# Patient Record
Sex: FEMALE | Race: WHITE | URBAN - METROPOLITAN AREA
[De-identification: names, ages, dates, MRNs, and addresses within clinical notes are randomized per-mention and may not be internally consistent; named-entity substitution may affect disease eponyms.]

---

## 2019-10-12 ENCOUNTER — HISTORICAL (OUTPATIENT)
Dept: ADMINISTRATIVE | Facility: HOSPITAL | Age: 29
End: 2019-10-12

## 2022-05-01 NOTE — ED PROVIDER NOTES
Patient:   Sandy Pack             MRN: [id number]            FIN: [id number]               Age:   29 years     Sex:  Female     :  1990   Associated Diagnoses:   Complete miscarriage   Author:   Elisha Taylor      Basic Information   Time seen: Date & time 10/12/2019 11:15:00.   History source: Patient.   Arrival mode: Private vehicle.   History limitation: None.   Additional information: Chief Complaint from Nursing Triage Note : Chief Complaint   10/12/2019 11:12 CDT     Chief Complaint            3 POSITIVE PREG TESTS STARTED BLEEDING TODAY WITH MINIMAL CRAMPING.  HEADACHE.AND BACK ACHE  .      History of Present Illness   The patient presents with pt presents to ED wtih c/o abd cramping, vaginal bleeding, and severe HA that started at 3am today. She reports she believes she is pregnant (approx 4-5 wks) and 3 home test positive. She reports a hx of HTN but has not been taking any meds for some time. Stated she had part of a red bull this am. Reports having low back pain as well. .  The onset was 8  hours ago.  The course/duration of symptoms is fluctuating in intensity.  Character of pain: cramping, location: pelvic and back degree: moderate.  Bleeding: mild passing clots.  Pregnancy Status : 4, Para: 3 5 weeks.  The exacerbating factor is none.  The relieving factor is none.  Risk factors consist of hypertension and blood type O+.  Prior episodes: none.  Therapy today: none.  Associated symptoms: back pain.        Review of Systems   Constitutional symptoms:  Negative except as documented in HPI.   Skin symptoms:  Negative except as documented in HPI.   Eye symptoms:  Negative except as documented in HPI.   ENMT symptoms:  Negative except as documented in HPI.   Respiratory symptoms:  Negative except as documented in HPI.   Cardiovascular symptoms:  Negative except as documented in HPI.   Gastrointestinal symptoms:  Pelvic, acute, cramping.    Genitourinary symptoms:   Vaginal bleeding.   Musculoskeletal symptoms:  Back pain.   Neurologic symptoms:  Negative except as documented in HPI.      Health Status   Allergies:    Allergic Reactions (Selected)  Medium  Excedrin Migraine- Rash, swelling.  Lisinopril- Dry cough.  Losartan- Kidney function.  Zithromax- Rash.  High  Ceclor- Anaphalaxix.,    Allergies (5) Active Reaction  Ceclor ANAPHALAXIX  Excedrin Migraine RASH, SWELLING  lisinopril DRY COUGH  losartan KIDNEY FUNCTION  Zithromax RASH  .   Medications:  (Selected)   Inpatient Medications  Ordered  Tylenol: 650 mg, form: Tab, Oral, Once, first dose 10/12/19 11:19:00 CDT, stop date 10/12/19 11:19:00 CDT, STAT.      Past Medical/ Family/ Social History   Medical history:    No active or resolved past medical history items have been selected or recorded., Reviewed as documented in chart.   Surgical history:    No active procedure history items have been selected or recorded., Reviewed as documented in chart.   Family history:    No family history items have been selected or recorded., Reviewed as documented in chart.   Social history:    Social & Psychosocial Habits    Alcohol  10/12/2019  Use: Never    Substance Use  10/12/2019  Use: Never    Tobacco  10/12/2019  Use: 5-9 cigarettes (between 1    Patient Wants Consult For Cessation Counseling No    Abuse/Neglect  10/12/2019  SHX Any signs of abuse or neglect No    Feels unsafe at home: No    Safe place to go: Yes  , Reviewed as documented in chart.   Problem list:    No qualifying data available  , per nurse's notes.      Physical Examination               Vital Signs   Vital Signs   10/12/2019 11:12 CDT     Temperature Oral          36.6 DegC                             Temperature Oral (calculated)             97.88 DegF                             Peripheral Pulse Rate     92 bpm                             Respiratory Rate          18 br/min                             SpO2                      96 %                              Oxygen Therapy            Room air                             Systolic Blood Pressure   134 mmHg                             Diastolic Blood Pressure  95 mmHg  HI  .      Vital Signs (last 24 hrs)_____  Last Charted___________  Temp Oral     36.6 DegC  (OCT 12 11:12)  Heart Rate Peripheral   92 bpm  (OCT 12 11:12)  Resp Rate         18 br/min  (OCT 12 11:12)  SBP      134 mmHg  (OCT 12 11:12)  DBP      H 95mmHg  (OCT 12 11:12)  SpO2      96 %  (OCT 12 11:12)  .   Measurements   10/12/2019 11:12 CDT     Weight Dosing             95 kg                             Weight Measured and Calculated in Lbs     209.44 lb                             Weight Estimated          95 kg                             Height/Length Dosing      163 cm                             Height/Length Estimated   163 cm                             Body Mass Index Estimated 35.76 kg/m2  .   Basic Oxygen Information   10/12/2019 11:12 CDT     SpO2                      96 %                             Oxygen Therapy            Room air  .   General:  Alert, no acute distress.    Skin:  Warm, dry, intact.    Head:  Normocephalic.   Neck:  Supple.   Eye:  Pupils are equal, round and reactive to light, extraocular movements are intact.    Cardiovascular:  Regular rate and rhythm, No murmur, Normal peripheral perfusion, No edema.    Respiratory:  Lungs are clear to auscultation, respirations are non-labored, breath sounds are equal, Symmetrical chest wall expansion.    Gastrointestinal:  Soft, Nontender, Non distended, Normal bowel sounds.    Genitourinary:  deferred.   Back:  Nontender, Normal range of motion.    Musculoskeletal:  Normal ROM, normal strength.    Neurological:  Alert and oriented to person, place, time, and situation, No focal neurological deficit observed, normal sensory observed, normal motor observed, normal speech observed, normal coordination observed.    Lymphatics:  No lymphadenopathy.   Psychiatric:  Cooperative, appropriate  mood & affect.       Medical Decision Making   Results review:  Lab results : Lab View   10/12/2019 11:37 CDT     UA Appear                 Clear                             UA Color                  Yellow                             UA Spec Grav              1.015                             UA Bili                   Negative                             UA pH                     7.0                             UA Urobilinogen           0.2                             UA Blood                  Large                             UA Glucose                Negative                             UA Ketones                Negative                             UA Protein                Negative                             UA Nitrite                Negative                             UA Leuk Est               Negative                             UA WBC                    None Seen                             UA RBC                    10-20 /HPF                             UA Bacteria               None Seen                             UA Squam Epithelial       None Seen    10/12/2019 11:26 CDT     Sodium Lvl                139 mmol/L                             Potassium Lvl             3.8 mmol/L                             Chloride                  103 mmol/L                             CO2                       24.7 mmol/L                             Calcium Lvl               9.3 mg/dL                             Glucose Lvl               116 mg/dL  HI                             BUN                       7.2 mg/dL                             Creatinine                0.68 mg/dL                             eGFR-AA                   >60 mL/min/1.73 m2  NA                             eGFR-PERLA                  >60 mL/min/1.73 m2  NA                             Bili Total                0.4 mg/dL                             Bili Direct               0.09 mg/dL                             Bili Indirect             0.31 mg/dL                              AST                       21 unit/L                             ALT                       26 unit/L                             Alk Phos                  106 unit/L                             Total Protein             8.0 gm/dL                             Albumin Lvl               4.00 gm/dL                             Globulin                  4.00 gm/dL  HI                             A/G Ratio                 1.0 ratio  LOW                             Beta hCG Ql               Negative                             TSH                       0.862 mIU/mL                             WBC                       10.2 x10(3)/mcL                             RBC                       5.01 x10(6)/mcL                             Hgb                       14.1 gm/dL                             Hct                       44.0 %                             Platelet                  323 x10(3)/mcL                             MCV                       87.8 fL                             MCH                       28.1 pg                             MCHC                      32.0 gm/dL  LOW                             RDW                       14.1 %                             MPV                       10.2 fL                             Abs Neut                  7.20 x10(3)/mcL                             Neutro Auto               71 %  NA                             Lymph Auto                21 %  NA                             Mono Auto                 7 %  NA                             Eos Auto                  0 %  NA                             Abs Eos                   0.0 x10(3)/mcL                             Basophil Auto             0 %  NA                             Abs Neutro                7.20 x10(3)/mcL                             Abs Lymph                 2.2 x10(3)/mcL                             Abs Mono                  0.7 x10(3)/mcL                             Abs Baso                   0.0 x10(3)/mcL                             IG%                       0.200 %                             IG#                       0.0200 %  HI  ,    No qualifying data available.    Radiology results:  Reported at  10/12/2019 12:39:00, Ultrasound, OB transvaginal: reported by US tech no evidence of IUP or ectopic pregnancy. Normal pelvic US. .       Reexamination/ Reevaluation   Time: 10/12/2019 12:40:00 .   Vital signs   Basic Oxygen Information   10/12/2019 11:12 CDT     SpO2                      96 %                             Oxygen Therapy            Room air     Notes: Discussed POC and findings with pt. She verblaized understanding. .      Impression and Plan   Diagnosis   Complete miscarriage ([facility]22-[facility] O03.9)   Plan   Condition: Stable.    Disposition: Discharged: Time  10/12/2019 12:41:00, to home.    Prescriptions: [facility] prescriptions   Pharmacy:  Naprosyn 500 mg oral tablet (Prescribe): 500 mg = 1 tab(s), Oral, BID, X 7 day(s), # 14 tab(s), 0 Refill(s), Pharmacy: [id number].    Patient was given the following educational materials: Miscarriage, Easy-to-Read, Miscarriage, Easy-to-Read.    Follow up with: ; Follow up with OB/GYN Follow-up with PCP in 3 to 5 days  Call for followup appointment.    Counseled: Patient.    Sexually transmitted disease prophylaxis: Discussed.    Orders: [facility] Orders   Admit/Transfer/Discharge:  Discharge (Order): 10/12/2019 12:42 CDT, Home.

## 2024-08-14 ENCOUNTER — HOSPITAL ENCOUNTER (EMERGENCY)
Facility: HOSPITAL | Age: 34
Discharge: HOME | End: 2024-08-15
Attending: EMERGENCY MEDICINE

## 2024-08-14 DIAGNOSIS — J02.9 PHARYNGITIS, UNSPECIFIED ETIOLOGY: Primary | ICD-10-CM

## 2024-08-14 LAB
FLUAV AG UPPER RESP QL IA.RAPID: NOT DETECTED
FLUBV AG UPPER RESP QL IA.RAPID: NOT DETECTED
SARS-COV-2 RNA RESP QL NAA+PROBE: NOT DETECTED
STREP A PCR (OHS): NOT DETECTED

## 2024-08-14 PROCEDURE — 87651 STREP A DNA AMP PROBE: CPT | Performed by: EMERGENCY MEDICINE

## 2024-08-14 PROCEDURE — 0240U COVID/FLU A&B PCR: CPT | Performed by: EMERGENCY MEDICINE

## 2024-08-14 PROCEDURE — 99284 EMERGENCY DEPT VISIT MOD MDM: CPT

## 2024-08-15 VITALS
OXYGEN SATURATION: 96 % | HEART RATE: 66 BPM | TEMPERATURE: 98 F | WEIGHT: 220 LBS | SYSTOLIC BLOOD PRESSURE: 155 MMHG | DIASTOLIC BLOOD PRESSURE: 92 MMHG | HEIGHT: 64 IN | RESPIRATION RATE: 16 BRPM | BODY MASS INDEX: 37.56 KG/M2

## 2024-08-15 PROCEDURE — 25000003 PHARM REV CODE 250: Performed by: EMERGENCY MEDICINE

## 2024-08-15 PROCEDURE — 63600175 PHARM REV CODE 636 W HCPCS: Performed by: EMERGENCY MEDICINE

## 2024-08-15 PROCEDURE — 96372 THER/PROPH/DIAG INJ SC/IM: CPT | Performed by: EMERGENCY MEDICINE

## 2024-08-15 RX ORDER — BETAMETHASONE SODIUM PHOSPHATE AND BETAMETHASONE ACETATE 3; 3 MG/ML; MG/ML
12 INJECTION, SUSPENSION INTRA-ARTICULAR; INTRALESIONAL; INTRAMUSCULAR; SOFT TISSUE
Status: COMPLETED | OUTPATIENT
Start: 2024-08-15 | End: 2024-08-15

## 2024-08-15 RX ORDER — LIDOCAINE HYDROCHLORIDE 20 MG/ML
15 SOLUTION OROPHARYNGEAL ONCE
Status: COMPLETED | OUTPATIENT
Start: 2024-08-15 | End: 2024-08-15

## 2024-08-15 RX ORDER — ALUMINUM HYDROXIDE, MAGNESIUM HYDROXIDE, AND SIMETHICONE 1200; 120; 1200 MG/30ML; MG/30ML; MG/30ML
30 SUSPENSION ORAL ONCE
Status: COMPLETED | OUTPATIENT
Start: 2024-08-15 | End: 2024-08-15

## 2024-08-15 RX ORDER — DOXYCYCLINE 100 MG/1
100 CAPSULE ORAL 2 TIMES DAILY
Qty: 20 CAPSULE | Refills: 0 | Status: SHIPPED | OUTPATIENT
Start: 2024-08-15 | End: 2024-08-25

## 2024-08-15 RX ORDER — KETOROLAC TROMETHAMINE 10 MG/1
10 TABLET, FILM COATED ORAL EVERY 6 HOURS PRN
Qty: 20 TABLET | Refills: 0 | Status: SHIPPED | OUTPATIENT
Start: 2024-08-15

## 2024-08-15 RX ADMIN — LIDOCAINE HYDROCHLORIDE 15 ML: 20 SOLUTION ORAL at 12:08

## 2024-08-15 RX ADMIN — BETAMETHASONE SODIUM PHOSPHATE AND BETAMETHASONE ACETATE 12 MG: 3; 3 INJECTION, SUSPENSION INTRA-ARTICULAR; INTRALESIONAL; INTRAMUSCULAR at 12:08

## 2024-08-15 RX ADMIN — ALUMINUM HYDROXIDE, MAGNESIUM HYDROXIDE, AND SIMETHICONE 30 ML: 200; 200; 20 SUSPENSION ORAL at 12:08

## 2024-08-15 NOTE — ED PROVIDER NOTES
Encounter Date: 8/14/2024       History     Chief Complaint   Patient presents with    Sore Throat     Sore throat for 9 days, tried otc medications and home remedies.She noticed some lymph node swelling.  Tested negative on at home covid test     Patient is a 33-year-old female who presents with a chief complaint of 9 days of constant sore throat pain.  She states that she has some mild lymphadenopathy that is developing.  There was no airway compromise.  She does not have vomiting.  She does not have objective fever but she did feel warm.  There is no history of similar problems in the past.      Review of patient's allergies indicates:   Allergen Reactions    Aspirin-acetaminophen-caffeine Hives     Other Reaction(s): RASH, SWELLING    Azithromycin      Other Reaction(s): Angioedema, RASH    Cefaclor Anaphylaxis     Other Reaction(s): ANAPHALAXIX    Losartan-hydrochlorothiazide Other (See Comments)     Muscle weakness    Pineapple Anaphylaxis    Ceftriaxone Hives    Lisinopril Other (See Comments)     Other Reaction(s): DRY COUGH    cough    Losartan      Other Reaction(s): KIDNEY FUNCTION    Pcn [penicillins] Hives     No past medical history on file.  No past surgical history on file.  No family history on file.     Review of Systems   Constitutional:  Negative for chills and fever.   HENT:  Positive for sore throat. Negative for congestion, rhinorrhea, sneezing and trouble swallowing.    Eyes: Negative.    Respiratory:  Negative for cough and shortness of breath.    Cardiovascular:  Negative for chest pain, palpitations and leg swelling.   Gastrointestinal:  Negative for abdominal distention, abdominal pain, diarrhea, nausea and vomiting.   Genitourinary:  Negative for difficulty urinating, dysuria, flank pain, frequency, hematuria and urgency.   Musculoskeletal:  Negative for back pain, joint swelling, myalgias and neck pain.   Skin:  Negative for rash.   Neurological:  Negative for seizures, syncope,  weakness, light-headedness and headaches.   Hematological: Negative.    Psychiatric/Behavioral:  Negative for hallucinations, sleep disturbance and suicidal ideas.    All other systems reviewed and are negative.      Physical Exam     Initial Vitals [08/14/24 1954]   BP Pulse Resp Temp SpO2   (!) 162/107 95 18 98.5 °F (36.9 °C) 97 %      MAP       --         Physical Exam    Nursing note and vitals reviewed.  Constitutional: Vital signs are normal. She appears well-nourished. She is cooperative.  Non-toxic appearance. She does not appear ill. No distress.   HENT:   Head: Normocephalic and atraumatic.   Mouth/Throat: Uvula is midline and mucous membranes are normal. No oral lesions. No trismus in the jaw. Posterior oropharyngeal erythema present. No oropharyngeal exudate, posterior oropharyngeal edema or tonsillar abscesses.   Eyes: Conjunctivae, EOM and lids are normal. Pupils are equal, round, and reactive to light.   Neck: Trachea normal. Neck supple. No stridor present. No tracheal deviation present. No JVD present.   Normal range of motion.  Cardiovascular:  Normal rate, regular rhythm, normal heart sounds and normal pulses.           No murmur heard.  Abdominal: Abdomen is soft. Bowel sounds are normal. There is no abdominal tenderness. There is no rebound and no guarding.   Musculoskeletal:      Cervical back: Normal range of motion and neck supple. No rigidity.     Lymphadenopathy:     She has no cervical adenopathy.   Neurological: She is alert and oriented to person, place, and time. She has normal strength. No cranial nerve deficit or sensory deficit. GCS eye subscore is 4. GCS verbal subscore is 5. GCS motor subscore is 6.   Skin: Skin is warm, dry and intact. Capillary refill takes less than 2 seconds.   Psychiatric: She has a normal mood and affect. Her behavior is normal. Judgment normal. She is not actively hallucinating. Thought content is not delusional. She expresses no homicidal and no suicidal  ideation.         ED Course   Procedures  Labs Reviewed   STREP GROUP A BY PCR - Normal       Result Value    STREP A PCR ([facility]) Not Detected      Narrative:     The Xpert Xpress Strep A test is a rapid, qualitative in vitro diagnostic test for the detection of Streptococcus pyogenes (Group A ß-hemolytic Streptococcus, Strep A) in throat swab specimens from patients with signs and symptoms of pharyngitis.     COVID/FLU A&B PCR - Normal    Influenza A PCR Not Detected      Influenza B PCR Not Detected      SARS-CoV-2 PCR Not Detected      Narrative:     The Xpert Xpress SARS-CoV-2/FLU/RSV plus is a rapid, multiplexed real-time PCR test intended for the simultaneous qualitative detection and differentiation of SARS-CoV-2, Influenza A, Influenza B, and respiratory syncytial virus (RSV) viral RNA in either nasopharyngeal swab or nasal swab specimens.                Imaging Results    None          Medications   aluminum-magnesium hydroxide-simethicone 200-200-20 mg/5 mL suspension 30 mL (30 mLs Oral Given 8/15/24 0042)     And   LIDOcaine viscous HCl 2% oral solution 15 mL (15 mLs Oral Given 8/15/24 0043)   betamethasone acetate-betamethasone sodium phosphate injection 12 mg (12 mg Intramuscular Given 8/15/24 0043)     Medical Decision Making  The patient is now resting comfortably, alert, and in no distress. The patient has a normal mental status and is neurologically intact. The patient appears well and is able to tolerate food or fluid by mouth, and there is no significant dehydration. There is no respiratory distress and no signs of systemic toxicity. The history, exam, diagnostic testing (if any), and current condition do not suggest peritonsillar abscess, retropharyngeal abscess, epiglottitis, Gadiel's angina, deep neck abscess, sepsis or other serious bacterial infection requiring further testing, treatment, consultation, or admission at this time. The vital signs have been stable. There is no significant trismus,  asymmetry of the posterior oropharynx, or abnormal appearance of the uvula.  The patient's condition is stable and appropriate for discharge. As the discharge instructions indicate, the patient will pursue further outpatient evaluation with the primary care physician or another designated or consulting physician.      Risk  OTC drugs.  Prescription drug management.               ED Course as of 08/15/24 0111   Thu Aug 15, 2024   0100 Influenza A, Molecular: Not Detected [DB]   0100 Influenza B, Molecular: Not Detected [DB]   0100 SARS-CoV2 (COVID-19) Qualitative PCR: Not Detected [DB]   0100 Strep Group A by PCR [DB]      ED Course User Index  [DB] Chandu Mark MD                           Clinical Impression:  Final diagnoses:  [J02.9] Pharyngitis, unspecified etiology (Primary)          ED Disposition Condition    Discharge Stable          ED Prescriptions       Medication Sig Dispense Start Date End Date Auth. Provider    doxycycline (VIBRAMYCIN) 100 MG Cap Take 1 capsule (100 mg total) by mouth 2 (two) times daily. for 10 days 20 capsule 8/15/2024 8/25/2024 Chandu Mark MD    ketorolac (TORADOL) 10 mg tablet Take 1 tablet (10 mg total) by mouth every 6 (six) hours as needed for Pain. 20 tablet 8/15/2024 -- Chandu Mark MD          Follow-up Information    None          Chandu Mark MD  08/15/24 0111

## 2025-03-05 ENCOUNTER — HOSPITAL ENCOUNTER (EMERGENCY)
Facility: HOSPITAL | Age: 35
Discharge: HOME | End: 2025-03-05
Attending: STUDENT IN AN ORGANIZED HEALTH CARE EDUCATION/TRAINING PROGRAM

## 2025-03-05 VITALS
BODY MASS INDEX: 37.56 KG/M2 | RESPIRATION RATE: 19 BRPM | WEIGHT: 220 LBS | OXYGEN SATURATION: 95 % | HEART RATE: 97 BPM | HEIGHT: 64 IN | TEMPERATURE: 100 F | DIASTOLIC BLOOD PRESSURE: 95 MMHG | SYSTOLIC BLOOD PRESSURE: 143 MMHG

## 2025-03-05 DIAGNOSIS — Z00.00 ROUTINE CHECK-UP: ICD-10-CM

## 2025-03-05 DIAGNOSIS — R19.7 DIARRHEA, UNSPECIFIED TYPE: ICD-10-CM

## 2025-03-05 DIAGNOSIS — A08.4 VIRAL GASTROENTERITIS: Primary | ICD-10-CM

## 2025-03-05 LAB
FLUAV AG UPPER RESP QL IA.RAPID: NOT DETECTED
FLUBV AG UPPER RESP QL IA.RAPID: NOT DETECTED
RSV A 5' UTR RNA NPH QL NAA+PROBE: NOT DETECTED
SARS-COV-2 RNA RESP QL NAA+PROBE: NOT DETECTED
STREP A PCR (OHS): NOT DETECTED

## 2025-03-05 PROCEDURE — 99284 EMERGENCY DEPT VISIT MOD MDM: CPT | Mod: 25

## 2025-03-05 PROCEDURE — 25000003 PHARM REV CODE 250: Performed by: STUDENT IN AN ORGANIZED HEALTH CARE EDUCATION/TRAINING PROGRAM

## 2025-03-05 PROCEDURE — 63600175 PHARM REV CODE 636 W HCPCS: Performed by: STUDENT IN AN ORGANIZED HEALTH CARE EDUCATION/TRAINING PROGRAM

## 2025-03-05 PROCEDURE — 96372 THER/PROPH/DIAG INJ SC/IM: CPT | Performed by: STUDENT IN AN ORGANIZED HEALTH CARE EDUCATION/TRAINING PROGRAM

## 2025-03-05 PROCEDURE — 0241U COVID/RSV/FLU A&B PCR: CPT | Performed by: EMERGENCY MEDICINE

## 2025-03-05 PROCEDURE — 87651 STREP A DNA AMP PROBE: CPT | Performed by: EMERGENCY MEDICINE

## 2025-03-05 RX ORDER — SUMATRIPTAN SUCCINATE 25 MG/1
25 TABLET ORAL EVERY 6 HOURS PRN
Qty: 20 TABLET | Refills: 0 | Status: SHIPPED | OUTPATIENT
Start: 2025-03-05

## 2025-03-05 RX ORDER — IBUPROFEN 600 MG/1
600 TABLET ORAL
Status: COMPLETED | OUTPATIENT
Start: 2025-03-05 | End: 2025-03-05

## 2025-03-05 RX ORDER — ONDANSETRON HYDROCHLORIDE 8 MG/1
8 TABLET, FILM COATED ORAL EVERY 6 HOURS
Qty: 20 TABLET | Refills: 0 | Status: SHIPPED | OUTPATIENT
Start: 2025-03-05

## 2025-03-05 RX ORDER — SUMATRIPTAN 6 MG/.5ML
6 INJECTION, SOLUTION SUBCUTANEOUS
Status: COMPLETED | OUTPATIENT
Start: 2025-03-05 | End: 2025-03-05

## 2025-03-05 RX ADMIN — IBUPROFEN 600 MG: 600 TABLET, FILM COATED ORAL at 03:03

## 2025-03-05 RX ADMIN — SUMATRIPTAN 6 MG: 6 INJECTION SUBCUTANEOUS at 03:03

## 2025-03-05 NOTE — ED PROVIDER NOTES
Encounter Date: 3/5/2025      History     Chief Complaint   Patient presents with    Diarrhea     Patient reports vomiting, diarrhea and bodyaches since yesterday . Last dose of tylenol at 10am     HPI: Please see MDM    Past Medical History:  She  has no past medical history on file.    Past Surgical History:  She  has no past surgical history on file.    Allergies:  Review of patient's allergies indicates:   Allergen Reactions    Aspirin-acetaminophen-caffeine Hives     Other Reaction(s): RASH, SWELLING    Azithromycin      Other Reaction(s): Angioedema, RASH    Cefaclor Anaphylaxis     Other Reaction(s): ANAPHALAXIX    Losartan-hydrochlorothiazide Other (See Comments)     Muscle weakness    Pineapple Anaphylaxis    Ceftriaxone Hives    Lisinopril Other (See Comments)     Other Reaction(s): DRY COUGH    cough    Losartan      Other Reaction(s): KIDNEY FUNCTION    Pcn [penicillins] Hives       Family History:  Her family history is not on file.    Social History:  She      Home Medications:  She has a current medication list which includes the following prescription(s): ketorolac and ondansetron.     ROS  Pertinent positives and negatives listed in HPI. All other systems are reviewed and are negative.    Physical Exam     Initial Vitals [03/05/25 1450]   BP Pulse Resp Temp SpO2   (!) 152/80 (!) 127 20 99.8 °F (37.7 °C) 98 %      MAP       --         General:  No acute distress.  Ill-appearing  HEENT: Normocephalic, atraumatic. Face symmetric.  Mucous membranes moist  Cardiovascular: Tachycardic with a regular rhythm  Pulmonary: Bilateral symmetric chest rise, Non-labored.  No wheezes rhonchi or crackles are noted somewhat diminished breath sounds bibasilarly.  Abdominal:  nondistended.  No tenderness to palpation  Extremities: No clubbing or cyanosis.  Skin:  Exposed skin is warm & dry.  Neuro:   Patient moves all extremities equally. Sensation intact bilateraly.    ED Course   Procedures  Labs Reviewed    COVID/RSV/FLU A&B PCR - Normal       Result Value    Influenza A PCR Not Detected      Influenza B PCR Not Detected      Respiratory Syncytial Virus PCR Not Detected      SARS-CoV-2 PCR Not Detected      Narrative:     The Xpert Xpress SARS-CoV-2/FLU/RSV plus is a rapid, multiplexed real-time PCR test intended for the simultaneous qualitative detection and differentiation of SARS-CoV-2, Influenza A, Influenza B, and respiratory syncytial virus (RSV) viral RNA in either nasopharyngeal swab or nasal swab specimens.         STREP GROUP A BY PCR - Normal    STREP A PCR ([facility]) Not Detected      Narrative:     The Xpert Xpress Strep A test is a rapid, qualitative in vitro diagnostic test for the detection of Streptococcus pyogenes (Group A ß-hemolytic Streptococcus, Strep A) in throat swab specimens from patients with signs and symptoms of pharyngitis.            Imaging Results              X-Ray Chest 1 View (Final result)  Result time 03/05/25 16:25:27      Final result by Ben Brantley MD (03/05/25 16:25:27)                   Impression:      No acute cardiopulmonary process identified.      Electronically signed by: Ben Brantley  Date:    03/05/2025  Time:    16:25               Narrative:    EXAMINATION:  XR CHEST 1 VIEW    CLINICAL HISTORY:  Encounter for general adult medical examination without abnormal findings    TECHNIQUE:  One View.    COMPARISON:  One view.    FINDINGS:  Cardiopericardial silhouette is within normal limits.  No acute dense focal or segmental consolidation, congestive process, pleural effusions or pneumothorax.                                       Medications   SUMAtriptan succinate injection 6 mg (6 mg Subcutaneous Given 3/5/25 1538)   ibuprofen tablet 600 mg (600 mg Oral Given 3/5/25 1538)     Medical Decision Making    Sandy Pack is a 34 y.o. female who presented to [facility] ED on 3/5/2025 with a primary complaint of vomiting diarrhea and body aches.  Patient states she had 5  episodes of diarrhea since last night.  Patient has not had any bowel movements since being in the emergency department.  Does report that her children told her that there was a lot of kids when the bus vomiting towards the end of last week she believes they made a brought something home.  Denies shortness or breath, dysuria, increased urinary frequency as well as cough.  Denies lightheadedness.    Physical exam as above.    Patient with subjective fever at home with diarrhea as well as emesis, COVID/FLU were negative.  Chest x-ray unremarkable.  Strep negative.  Patient able to tolerate p.o., we will discharged home with recommendations to increase fluid intake, we will prescribe Zofran for nausea and vomiting.  Strict ED precautions were given.    Amount and/or Complexity of Data Reviewed  External Data Reviewed:  Notes, labs  Labs:  All labs that have been ordered have been reviewed and are documented in ED Course.  Radiology: All images that have been ordered have been reviewed and are documented in ED Course.         Ddx:  Infectious diarrhea, viral gastroenteritis, malabsorption syndrome, appendicitis, pancreatitis, bacterial infection, parasitic infection, covid/flu, IBD, IBS, C-diff, medication induced, osmotic diarrhea, constipation,  among others      The patient is resting comfortably and is in no acute distress.  Patient states that her symptoms have improved after treatment within ER. Discussed test results, shared treatment plan, specific conditions for return, and importance of follow up with patient and family. Advised to complete her treatment with Medications listed below. as well. The patient understands and agrees with the plan as discussed. Answered all patient questions at this time.She has remained hemodynamically stable throughout the ED course and is appropriate for discharge home.                           Clinical Impression:  Final diagnoses:  [Z00.00] Routine check-up  [A08.4] Viral  gastroenteritis (Primary)  [R19.7] Diarrhea, unspecified type            ED Disposition Condition    Discharge Stable          ED Prescriptions       Medication Sig Dispense Start Date End Date Auth. Provider    ondansetron (ZOFRAN) 8 MG tablet Take 1 tablet (8 mg total) by mouth every 6 (six) hours. 20 tablet 3/5/2025 -- Ronnie Pearson MD          Follow-up Information       Follow up With Specialties Details Why Contact Info    PCP  Schedule an appointment as soon as possible for a visit in 3 days Please call to make/infer about appointment     [facility] - Emergency Dept Emergency Medicine  If symptoms worsen [address] [facility] [id number] [phone]             Ronnie Pearson MD  03/05/25 [id number]